# Patient Record
Sex: FEMALE | Race: OTHER | Employment: STUDENT | URBAN - METROPOLITAN AREA
[De-identification: names, ages, dates, MRNs, and addresses within clinical notes are randomized per-mention and may not be internally consistent; named-entity substitution may affect disease eponyms.]

---

## 2018-07-27 LAB
ESTRADIOL LEVEL: 97 PG/ML
FOLLICLE STIMULATING HORMONE: 6.9 MIU/ML
PROLACTIN: 15.2 NG/ML
TSH SERPL DL<=0.05 MIU/L-ACNC: 4.47 UIU/ML (ref 0.27–4.2)

## 2018-07-31 LAB
Lab: 30 NG/DL (ref 20–70)
SEX HORMONE BINDING GLOBULIN: 32 NMOL/L (ref 30–135)
SEX HORMONE BINDING GLOBULIN: 32 NMOL/L (ref 30–135)
TESTOSTERONE FREE-NONMALE: 5.5 PG/ML (ref 0.8–7.4)
TESTOSTERONE FREE: 5.5 PG/ML (ref 0.8–7.4)
TESTOSTERONE TOTAL: 30 NG/DL (ref 20–70)

## 2019-12-02 ENCOUNTER — OFFICE VISIT (OUTPATIENT)
Dept: PRIMARY CARE CLINIC | Age: 24
End: 2019-12-02
Payer: MEDICAID

## 2019-12-02 ENCOUNTER — TELEPHONE (OUTPATIENT)
Dept: PRIMARY CARE CLINIC | Age: 24
End: 2019-12-02

## 2019-12-02 VITALS
DIASTOLIC BLOOD PRESSURE: 78 MMHG | SYSTOLIC BLOOD PRESSURE: 118 MMHG | HEART RATE: 94 BPM | WEIGHT: 152.4 LBS | BODY MASS INDEX: 26.02 KG/M2 | OXYGEN SATURATION: 98 % | RESPIRATION RATE: 17 BRPM | HEIGHT: 64 IN | TEMPERATURE: 98.4 F

## 2019-12-02 DIAGNOSIS — Z00.00 ANNUAL PHYSICAL EXAM: Primary | ICD-10-CM

## 2019-12-02 PROCEDURE — 99385 PREV VISIT NEW AGE 18-39: CPT | Performed by: INTERNAL MEDICINE

## 2019-12-02 SDOH — HEALTH STABILITY: MENTAL HEALTH: HOW OFTEN DO YOU HAVE A DRINK CONTAINING ALCOHOL?: NEVER

## 2019-12-02 ASSESSMENT — ENCOUNTER SYMPTOMS
TROUBLE SWALLOWING: 0
CHEST TIGHTNESS: 0
EYE PAIN: 0
EYE DISCHARGE: 0
SINUS PAIN: 0
ABDOMINAL PAIN: 0
SORE THROAT: 0
VOMITING: 0
WHEEZING: 0
SINUS PRESSURE: 0
RHINORRHEA: 0
COUGH: 0
BLOOD IN STOOL: 0
SHORTNESS OF BREATH: 0
NAUSEA: 0

## 2020-01-06 ENCOUNTER — NURSE ONLY (OUTPATIENT)
Dept: PRIMARY CARE CLINIC | Age: 25
End: 2020-01-06
Payer: MEDICAID

## 2020-01-06 PROCEDURE — 86580 TB INTRADERMAL TEST: CPT | Performed by: INTERNAL MEDICINE

## 2020-01-06 NOTE — PROGRESS NOTES
Patient for 1st step PPD in left forearm will return in 48 hrs for reading. Patient needs second step week later.

## 2020-01-08 ENCOUNTER — NURSE ONLY (OUTPATIENT)
Dept: PRIMARY CARE CLINIC | Age: 25
End: 2020-01-08

## 2020-01-08 LAB
INDURATION: NORMAL
TB SKIN TEST: NORMAL

## 2020-01-08 NOTE — PROGRESS NOTES
Immunization(s) administered during visit:          Patient PPD reading was Neg    Patient instructed to remain in clinic for 20 minutes after injection and was advised to report any adverse reaction to me immediately.

## 2020-01-15 ENCOUNTER — NURSE ONLY (OUTPATIENT)
Dept: PRIMARY CARE CLINIC | Age: 25
End: 2020-01-15
Payer: MEDICAID

## 2020-01-15 PROCEDURE — 86580 TB INTRADERMAL TEST: CPT | Performed by: INTERNAL MEDICINE

## 2020-01-15 NOTE — PROGRESS NOTES
Pt in today for placement of 2nd step PPD was placed in right forearm pt to return 48 hrs to be read.

## 2020-01-17 ENCOUNTER — HOSPITAL ENCOUNTER (OUTPATIENT)
Dept: GENERAL RADIOLOGY | Age: 25
Discharge: HOME | End: 2020-01-17
Payer: MEDICAID

## 2020-01-17 ENCOUNTER — HOSPITAL ENCOUNTER (OUTPATIENT)
Age: 25
Discharge: HOME | End: 2020-01-17
Payer: MEDICAID

## 2020-01-17 PROCEDURE — 71046 X-RAY EXAM CHEST 2 VIEWS: CPT

## 2025-01-13 ENCOUNTER — OFFICE VISIT (OUTPATIENT)
Age: 30
End: 2025-01-13

## 2025-01-13 VITALS
WEIGHT: 175.8 LBS | DIASTOLIC BLOOD PRESSURE: 86 MMHG | TEMPERATURE: 98.1 F | OXYGEN SATURATION: 95 % | HEART RATE: 111 BPM | BODY MASS INDEX: 29.29 KG/M2 | SYSTOLIC BLOOD PRESSURE: 119 MMHG | HEIGHT: 65 IN

## 2025-01-13 DIAGNOSIS — J40 BRONCHITIS: Primary | ICD-10-CM

## 2025-01-13 DIAGNOSIS — H66.90 ACUTE OTITIS MEDIA, UNSPECIFIED OTITIS MEDIA TYPE: ICD-10-CM

## 2025-01-13 RX ORDER — METHYLPREDNISOLONE 4 MG/1
TABLET ORAL
Qty: 1 KIT | Refills: 0 | Status: SHIPPED | OUTPATIENT
Start: 2025-01-13 | End: 2025-01-19

## 2025-01-13 RX ORDER — BROMPHENIRAMINE MALEATE, PSEUDOEPHEDRINE HYDROCHLORIDE, AND DEXTROMETHORPHAN HYDROBROMIDE 2; 30; 10 MG/5ML; MG/5ML; MG/5ML
5 SYRUP ORAL 4 TIMES DAILY PRN
Qty: 200 ML | Refills: 0 | Status: SHIPPED | OUTPATIENT
Start: 2025-01-13 | End: 2025-01-23

## 2025-01-13 RX ORDER — ALBUTEROL SULFATE 90 UG/1
2 INHALANT RESPIRATORY (INHALATION) EVERY 6 HOURS PRN
Qty: 18 G | Refills: 3 | Status: SHIPPED | OUTPATIENT
Start: 2025-01-13

## 2025-01-13 RX ORDER — AMOXICILLIN 500 MG/1
500 CAPSULE ORAL 3 TIMES DAILY
Qty: 30 CAPSULE | Refills: 0 | Status: SHIPPED | OUTPATIENT
Start: 2025-01-13 | End: 2025-01-23

## 2025-01-13 ASSESSMENT — ENCOUNTER SYMPTOMS
HEMOPTYSIS: 0
RHINORRHEA: 1
ABDOMINAL PAIN: 0
ALLERGIC/IMMUNOLOGIC NEGATIVE: 1
EYE ITCHING: 0
EYE PAIN: 0
VOMITING: 0
NAUSEA: 0
EYES NEGATIVE: 1
EYE DISCHARGE: 0
SORE THROAT: 1
SHORTNESS OF BREATH: 1
COUGH: 1
HEARTBURN: 0
GASTROINTESTINAL NEGATIVE: 1

## 2025-01-14 NOTE — PROGRESS NOTES
Laxmi Khatiwada (:  1995) is a 29 y.o. female,New patient, here for evaluation of the following chief complaint(s):  Cough (Persistent Cough and itchy throat since )      ASSESSMENT/PLAN:  Encounter Diagnoses   Name Primary?    Bronchitis Yes    Acute otitis media, unspecified otitis media type      Orders Placed This Encounter   Medications    amoxicillin (AMOXIL) 500 MG capsule     Sig: Take 1 capsule by mouth 3 times daily for 10 days     Dispense:  30 capsule     Refill:  0    albuterol sulfate HFA (PROVENTIL;VENTOLIN;PROAIR) 108 (90 Base) MCG/ACT inhaler     Sig: Inhale 2 puffs into the lungs every 6 hours as needed for Wheezing     Dispense:  18 g     Refill:  3    methylPREDNISolone (MEDROL DOSEPACK) 4 MG tablet     Sig: Take by mouth.     Dispense:  1 kit     Refill:  0    brompheniramine-pseudoephedrine-DM 2-30-10 MG/5ML syrup     Sig: Take 5 mLs by mouth 4 times daily as needed for Congestion or Cough     Dispense:  200 mL     Refill:  0   Take all of the amoxicillin, three times daily for 10 days  Take the albuterol inhaler every 3 hours as needed for coughing  Take the medrol dose pack according to the pacakage, it is a tapering dose  Use the bromfed every 6 hours as needed for cough and congestion   May use tylenol or ibuprofen as needed for discomfort   Adequate hydration and rest. If no improvement in 3-5 days, follow up with PCP. Patient voices understanding and no further questions.         SUBJECTIVE/OBJECTIVE:  Patient presents with persistent cough and post nasal drainage. Symptoms present for 1 month and with no improvement. Cough is very dry and unrelenting. Nasal congestion continues and chest feels tight. She is tired and sometimes headache      History provided by:  Patient   used: No    Cough  This is a new problem. The current episode started in the past 7 days. The problem has been gradually worsening. Associated symptoms include ear congestion, ear

## 2025-01-14 NOTE — PATIENT INSTRUCTIONS
Take all of the amoxicillin, three times daily for 10 days  Take the albuterol inhaler every 3 hours as needed for coughing  Take the medrol dose pack according to the pacakage, it is a tapering dose  Use the bromfed every 6 hours as needed for cough and congestion   May use tylenol or ibuprofen as needed for discomfort